# Patient Record
Sex: FEMALE | Race: WHITE | NOT HISPANIC OR LATINO | Employment: OTHER | URBAN - METROPOLITAN AREA
[De-identification: names, ages, dates, MRNs, and addresses within clinical notes are randomized per-mention and may not be internally consistent; named-entity substitution may affect disease eponyms.]

---

## 2023-08-01 LAB
ALANINE AMINOTRANSFERASE (SGPT) (U/L) IN SER/PLAS: 53 U/L (ref 7–45)
ALBUMIN (G/DL) IN SER/PLAS: 4.3 G/DL (ref 3.4–5)
ALKALINE PHOSPHATASE (U/L) IN SER/PLAS: 91 U/L (ref 33–136)
ANION GAP IN SER/PLAS: 12 MMOL/L (ref 10–20)
ASPARTATE AMINOTRANSFERASE (SGOT) (U/L) IN SER/PLAS: 24 U/L (ref 9–39)
BASOPHILS (10*3/UL) IN BLOOD BY AUTOMATED COUNT: 0.04 X10E9/L (ref 0–0.1)
BASOPHILS/100 LEUKOCYTES IN BLOOD BY AUTOMATED COUNT: 1.2 % (ref 0–2)
BILIRUBIN TOTAL (MG/DL) IN SER/PLAS: 0.6 MG/DL (ref 0–1.2)
CALCIUM (MG/DL) IN SER/PLAS: 9.4 MG/DL (ref 8.6–10.3)
CARBON DIOXIDE, TOTAL (MMOL/L) IN SER/PLAS: 28 MMOL/L (ref 21–32)
CHLORIDE (MMOL/L) IN SER/PLAS: 102 MMOL/L (ref 98–107)
CHOLESTEROL (MG/DL) IN SER/PLAS: 203 MG/DL (ref 0–199)
CHOLESTEROL IN HDL (MG/DL) IN SER/PLAS: 55.4 MG/DL
CHOLESTEROL/HDL RATIO: 3.7
CREATINE KINASE (U/L) IN SER/PLAS: 276 U/L (ref 0–215)
CREATININE (MG/DL) IN SER/PLAS: 1.02 MG/DL (ref 0.5–1.05)
EOSINOPHILS (10*3/UL) IN BLOOD BY AUTOMATED COUNT: 0.19 X10E9/L (ref 0–0.7)
EOSINOPHILS/100 LEUKOCYTES IN BLOOD BY AUTOMATED COUNT: 5.7 % (ref 0–6)
ERYTHROCYTE DISTRIBUTION WIDTH (RATIO) BY AUTOMATED COUNT: 11.8 % (ref 11.5–14.5)
ERYTHROCYTE MEAN CORPUSCULAR HEMOGLOBIN CONCENTRATION (G/DL) BY AUTOMATED: 31.8 G/DL (ref 32–36)
ERYTHROCYTE MEAN CORPUSCULAR VOLUME (FL) BY AUTOMATED COUNT: 91 FL (ref 80–100)
ERYTHROCYTES (10*6/UL) IN BLOOD BY AUTOMATED COUNT: 4.37 X10E12/L (ref 4–5.2)
GFR FEMALE: 59 ML/MIN/1.73M2
GLUCOSE (MG/DL) IN SER/PLAS: 106 MG/DL (ref 74–99)
HEMATOCRIT (%) IN BLOOD BY AUTOMATED COUNT: 39.9 % (ref 36–46)
HEMOGLOBIN (G/DL) IN BLOOD: 12.7 G/DL (ref 12–16)
IMMATURE GRANULOCYTES/100 LEUKOCYTES IN BLOOD BY AUTOMATED COUNT: 0.3 % (ref 0–0.9)
LDL: 133 MG/DL (ref 0–99)
LEUKOCYTES (10*3/UL) IN BLOOD BY AUTOMATED COUNT: 3.3 X10E9/L (ref 4.4–11.3)
LYMPHOCYTES (10*3/UL) IN BLOOD BY AUTOMATED COUNT: 1.5 X10E9/L (ref 1.2–4.8)
LYMPHOCYTES/100 LEUKOCYTES IN BLOOD BY AUTOMATED COUNT: 45.2 % (ref 13–44)
MONOCYTES (10*3/UL) IN BLOOD BY AUTOMATED COUNT: 0.38 X10E9/L (ref 0.1–1)
MONOCYTES/100 LEUKOCYTES IN BLOOD BY AUTOMATED COUNT: 11.4 % (ref 2–10)
NEUTROPHILS (10*3/UL) IN BLOOD BY AUTOMATED COUNT: 1.2 X10E9/L (ref 1.2–7.7)
NEUTROPHILS/100 LEUKOCYTES IN BLOOD BY AUTOMATED COUNT: 36.2 % (ref 40–80)
PLATELETS (10*3/UL) IN BLOOD AUTOMATED COUNT: 249 X10E9/L (ref 150–450)
POTASSIUM (MMOL/L) IN SER/PLAS: 4.4 MMOL/L (ref 3.5–5.3)
PROTEIN TOTAL: 7.3 G/DL (ref 6.4–8.2)
SODIUM (MMOL/L) IN SER/PLAS: 138 MMOL/L (ref 136–145)
THYROTROPIN (MIU/L) IN SER/PLAS BY DETECTION LIMIT <= 0.05 MIU/L: 2.09 MIU/L (ref 0.44–3.98)
TRIGLYCERIDE (MG/DL) IN SER/PLAS: 71 MG/DL (ref 0–149)
UREA NITROGEN (MG/DL) IN SER/PLAS: 17 MG/DL (ref 6–23)
VLDL: 14 MG/DL (ref 0–40)

## 2023-09-11 PROBLEM — M47.812 OSTEOARTHRITIS OF CERVICAL SPINE: Status: ACTIVE | Noted: 2023-09-11

## 2023-09-11 PROBLEM — K80.20 GALL STONES: Status: ACTIVE | Noted: 2023-09-11

## 2023-09-11 PROBLEM — M19.90 OSTEOARTHRITIS: Status: ACTIVE | Noted: 2023-09-11

## 2023-09-11 PROBLEM — R74.8 ELEVATED CK: Status: ACTIVE | Noted: 2023-09-11

## 2023-09-11 PROBLEM — M54.81 OCCIPITAL NEURALGIA: Status: ACTIVE | Noted: 2023-09-11

## 2023-09-11 PROBLEM — M54.12 CERVICAL RADICULOPATHY: Status: ACTIVE | Noted: 2023-09-11

## 2023-09-11 PROBLEM — Z78.0 POSTMENOPAUSAL ESTROGEN DEFICIENCY: Status: ACTIVE | Noted: 2023-09-11

## 2023-09-11 PROBLEM — R73.01 IMPAIRED FASTING GLUCOSE: Status: ACTIVE | Noted: 2023-09-11

## 2023-09-11 PROBLEM — M75.81 ROTATOR CUFF TENDONITIS, RIGHT: Status: ACTIVE | Noted: 2023-09-11

## 2023-09-11 PROBLEM — K12.0 APHTHOUS STOMATITIS: Status: ACTIVE | Noted: 2023-09-11

## 2023-09-11 PROBLEM — S39.012A LUMBAR STRAIN: Status: ACTIVE | Noted: 2023-09-11

## 2023-09-11 PROBLEM — B37.2 CANDIDAL INTERTRIGO: Status: ACTIVE | Noted: 2023-09-11

## 2023-09-11 PROBLEM — L30.9 ECZEMA: Status: ACTIVE | Noted: 2023-09-11

## 2023-09-11 PROBLEM — E66.3 OVERWEIGHT WITH BODY MASS INDEX (BMI) OF 26 TO 26.9 IN ADULT: Status: ACTIVE | Noted: 2023-09-11

## 2023-09-11 PROBLEM — E78.5 HYPERLIPIDEMIA: Status: ACTIVE | Noted: 2023-09-11

## 2023-09-11 PROBLEM — R92.8 MAMMOGRAM ABNORMAL: Status: ACTIVE | Noted: 2023-09-11

## 2023-09-11 PROBLEM — Z13.31 POSITIVE SCREENING FOR DEPRESSION ON 2-ITEM PATIENT HEALTH QUESTIONNAIRE (PHQ-2): Status: ACTIVE | Noted: 2023-09-11

## 2023-09-11 PROBLEM — R74.8 ELEVATED LIVER ENZYMES: Status: ACTIVE | Noted: 2023-09-11

## 2023-09-11 PROBLEM — M70.70 ISCHIAL BURSITIS: Status: ACTIVE | Noted: 2023-09-11

## 2023-09-11 PROBLEM — I10 ESSENTIAL HYPERTENSION: Status: ACTIVE | Noted: 2023-09-11

## 2023-09-11 PROBLEM — S82.899A ANKLE FRACTURE: Status: ACTIVE | Noted: 2023-09-11

## 2023-09-11 RX ORDER — TRIAMCINOLONE ACETONIDE 1 MG/G
PASTE DENTAL
COMMUNITY
Start: 2020-10-15

## 2023-09-11 RX ORDER — POTASSIUM CHLORIDE 20 MEQ/1
1 TABLET, EXTENDED RELEASE ORAL DAILY
COMMUNITY
End: 2023-12-26

## 2023-09-11 RX ORDER — NORTRIPTYLINE HYDROCHLORIDE 50 MG/1
1 CAPSULE ORAL NIGHTLY
COMMUNITY
End: 2023-10-02 | Stop reason: SDUPTHER

## 2023-09-11 RX ORDER — EZETIMIBE 10 MG/1
1 TABLET ORAL DAILY
COMMUNITY
End: 2023-11-22 | Stop reason: SDUPTHER

## 2023-09-11 RX ORDER — GABAPENTIN 100 MG/1
3 CAPSULE ORAL NIGHTLY
COMMUNITY
Start: 2023-04-09 | End: 2023-12-26 | Stop reason: SDUPTHER

## 2023-09-11 RX ORDER — LISINOPRIL 20 MG/1
1 TABLET ORAL DAILY
COMMUNITY
End: 2024-01-08

## 2023-09-11 RX ORDER — SPIRONOLACTONE 25 MG/1
1 TABLET ORAL DAILY
COMMUNITY
End: 2024-01-09

## 2023-09-11 RX ORDER — CLOTRIMAZOLE AND BETAMETHASONE DIPROPIONATE 10; .64 MG/G; MG/G
CREAM TOPICAL 2 TIMES DAILY
COMMUNITY
Start: 2014-08-08

## 2023-10-02 DIAGNOSIS — M79.7 FIBROMYALGIA: Primary | ICD-10-CM

## 2023-10-02 RX ORDER — NORTRIPTYLINE HYDROCHLORIDE 50 MG/1
50 CAPSULE ORAL NIGHTLY
Qty: 90 CAPSULE | Refills: 1 | Status: SHIPPED | OUTPATIENT
Start: 2023-10-02 | End: 2024-05-28

## 2023-10-13 ENCOUNTER — OFFICE VISIT (OUTPATIENT)
Dept: RHEUMATOLOGY | Facility: CLINIC | Age: 73
End: 2023-10-13
Payer: MEDICARE

## 2023-10-13 VITALS
HEIGHT: 68 IN | DIASTOLIC BLOOD PRESSURE: 68 MMHG | WEIGHT: 171.2 LBS | BODY MASS INDEX: 25.94 KG/M2 | HEART RATE: 73 BPM | TEMPERATURE: 97.1 F | SYSTOLIC BLOOD PRESSURE: 122 MMHG

## 2023-10-13 DIAGNOSIS — I10 ESSENTIAL HYPERTENSION: Primary | ICD-10-CM

## 2023-10-13 DIAGNOSIS — M54.12 CERVICAL RADICULOPATHY: ICD-10-CM

## 2023-10-13 DIAGNOSIS — M12.812 ROTATOR CUFF ARTHROPATHY OF BOTH SHOULDERS: ICD-10-CM

## 2023-10-13 DIAGNOSIS — R74.8 ELEVATED CK: ICD-10-CM

## 2023-10-13 DIAGNOSIS — R73.01 IMPAIRED FASTING GLUCOSE: ICD-10-CM

## 2023-10-13 DIAGNOSIS — E66.3 OVERWEIGHT WITH BODY MASS INDEX (BMI) OF 26 TO 26.9 IN ADULT: ICD-10-CM

## 2023-10-13 DIAGNOSIS — E78.5 HYPERLIPIDEMIA, UNSPECIFIED HYPERLIPIDEMIA TYPE: ICD-10-CM

## 2023-10-13 DIAGNOSIS — R74.01 ELEVATED LIVER TRANSAMINASE LEVEL: ICD-10-CM

## 2023-10-13 DIAGNOSIS — M12.811 ROTATOR CUFF ARTHROPATHY OF BOTH SHOULDERS: ICD-10-CM

## 2023-10-13 PROCEDURE — G0008 ADMIN INFLUENZA VIRUS VAC: HCPCS | Performed by: INTERNAL MEDICINE

## 2023-10-13 PROCEDURE — 90662 IIV NO PRSV INCREASED AG IM: CPT | Performed by: INTERNAL MEDICINE

## 2023-10-13 PROCEDURE — 99214 OFFICE O/P EST MOD 30 MIN: CPT | Performed by: INTERNAL MEDICINE

## 2023-10-13 PROCEDURE — 1160F RVW MEDS BY RX/DR IN RCRD: CPT | Performed by: INTERNAL MEDICINE

## 2023-10-13 PROCEDURE — 1036F TOBACCO NON-USER: CPT | Performed by: INTERNAL MEDICINE

## 2023-10-13 PROCEDURE — 3078F DIAST BP <80 MM HG: CPT | Performed by: INTERNAL MEDICINE

## 2023-10-13 PROCEDURE — 3008F BODY MASS INDEX DOCD: CPT | Performed by: INTERNAL MEDICINE

## 2023-10-13 PROCEDURE — 3074F SYST BP LT 130 MM HG: CPT | Performed by: INTERNAL MEDICINE

## 2023-10-13 PROCEDURE — 1159F MED LIST DOCD IN RCRD: CPT | Performed by: INTERNAL MEDICINE

## 2023-10-13 RX ORDER — METHYLPREDNISOLONE 4 MG/1
TABLET ORAL
Qty: 21 TABLET | Refills: 0 | Status: SHIPPED | OUTPATIENT
Start: 2023-10-13 | End: 2023-10-20

## 2023-10-13 ASSESSMENT — PATIENT HEALTH QUESTIONNAIRE - PHQ9
SUM OF ALL RESPONSES TO PHQ9 QUESTIONS 1 AND 2: 0
1. LITTLE INTEREST OR PLEASURE IN DOING THINGS: NOT AT ALL
2. FEELING DOWN, DEPRESSED OR HOPELESS: NOT AT ALL

## 2023-10-13 ASSESSMENT — ENCOUNTER SYMPTOMS
LOSS OF SENSATION IN FEET: 0
OCCASIONAL FEELINGS OF UNSTEADINESS: 0
DEPRESSION: 0

## 2023-10-13 NOTE — PROGRESS NOTES
Subjective   Patient ID: Fany Ramirez is a 72 y.o. female who presents for Follow-up.    HPI 72-year-old female with history of essential hypertension, hyperlipidemia, osteoarthritis, osteopenia, and hyper CKemia who is here for f/u.    Left shoulder pain was better after injection of left subacromial bursa 2/23.     She now c/o recurrent pain in both shoulders, right greater than left.  Pain has been present for 1 to 2 months.  The pain radiates down her right arm to the hand.  She denies any numbness and tingling in her hand.  She has slight neck pain.  Cervical spine x-ray done February 2023 shows  Previous fusion at C4-6, degenerative disc disease at C3-4, C4-5, C6-7.    She had transient elevation of her liver enzymes August 2022 (primarily liver transaminases), without clear etiology.  She had no abdominal pain and was otherwise feeling fine.  Liver enzymes repeated October 10, 2022 are normal.  She is known to have gallstones on right upper quadrant ultrasound done in 2016.    She had corticosteroid injection of her right subacromial bursa 2/22 and 6/22, which helped her shoulder pain.    She stopped using medical marijuana after having a sinus infection in April 2022.    Because of her impaired fasting glucose, she has been following a low carbohydrate diet.    She does live with her son-in-law, daughter and 2 grandchildren.    She received Pfizer COVID-19 vaccine March 27, 2021 and April 17, 2021. She had boosters November 28, 2021 and 7/02/22.    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  EKG 2/13/20: Sinus arrhythmia, otherwise normal.  EKG October 8, 2021: Normal    DEXA 4/18: T score -1.4 femoral neck, normal lumbar spine.  DEXA 1/22: T score -1.1 left femoral neck (increased 5.1%), T score normal total hip, T score normal  "lumbar spine.    Labs 2/22: BMP normal except glucose 109, hemoglobin A1c 6.1  Labs 8/22: CMP normal except alkaline phosphatase 153,  (7-45),  (9-39), , cholesterol 207, HDL 69, , triglycerides 59  Labs October 10, 2022: CMP normal except random glucose 107 (GFR 59)  Labs 8/23: Seen CMP normal except glucose 106 and GFR 59 and ALT 53, CK2 76, CBC normal except white blood cell count 3.3, cholesterol 203, HDL 55, , triglycerides 171    Cervical spine x-ray done February 2023: Evidence of anterior fusion C4-C6. There is disc height loss with spurring at C3-4, C6-7, and .-T1.  Lumbar spine x-ray done 2/23: Further disc space height loss at L4-S1 with spinal and neural foraminal stenosis at L4-S1, progressed from previous exam.    Health maintenance:   Mammogram 8/23 CT plaints of bilateral shoulder pain, right greater than left   Colonoscopy 11/14- needs 10 year follow-up   Tdap 11/12   HD Flu shot 10/08/21   Zostavax 11/15   Prevnar 3/18   Pneumovax 6/19   Shingrix #1 December 2018, #2 3/19   Pfizer COVID-19 vaccine 3/27/21. 4/14/21, booster 11/28/21, booster 7/02/22    ROS:  General: Denies fevers or chills.  CV: Denies chest pain or palpitations.  Denies leg edema.  Lungs: Denies coughing or shortness of breath.  Skin: Denies rashes or nodules.  MS: c/o bilateral shoulder pain, right greater than left. Has pain radiating down right arm to hand. Also has some neck pain.    Objective   /68 (BP Location: Right arm, Patient Position: Sitting, BP Cuff Size: Adult)   Pulse 73   Temp 36.2 °C (97.1 °F) (Temporal)   Ht 1.721 m (5' 7.75\")   Wt 77.7 kg (171 lb 3.2 oz)   BMI 26.22 kg/m²     Physical Exam  HEENT: PERRL, EOMI  Neck: Supple, no nodes.  CV: RRR, no MGR.  Lungs: Clear, no rales or wheezes.  Abdomen: Soft, nontender. No hepatosplenomegaly.  Extremities:  No cyanosis, clubbing, or edema.   MS: Shoulder motion without warmth or effusion.  Has pain in right shoulder with " abduction greater than 80 degrees, active greater than passive.  Has pain with forward elevation greater than 80 degrees.  Has pain with extension and internal rotation-can internally rotate to L5.  Has pain in the left shoulder with abduction greater than 90 degrees, active greater than passive.  Has pain with forward elevation greater than 90 degrees.  She can internally rotate with the left arm to L3.    Assessment/Plan   Problem List Items Addressed This Visit             ICD-10-CM    Cervical radiculopathy M54.12    Relevant Medications    methylPREDNISolone (Medrol Dospak) 4 mg tablets    Elevated CK R74.8    Essential hypertension - Primary I10    Hyperlipidemia E78.5    Impaired fasting glucose R73.01    Overweight with body mass index (BMI) of 26 to 26.9 in adult E66.3, Z68.26    Rotator cuff arthropathy M12.819     Other Visit Diagnoses         Codes    Elevated liver transaminase level     R74.01    Relevant Orders    Hepatic function panel          Impression:  Bilateral rotator cuff tendinitis-right greater than left.    Cervical radiculopathy-likely also contributing to pain rating down the right arm of the hand.    Follow-up in 4 months  Chronically elevated CK-currently mildly elevated at 276.    Mildly elevated ALT.    Impaired fasting glucose-she will continue to minimize carbohydrate intake watch weight and continue exercise,.    BMI 26- stable.    Plan:  Try Medrol Dosepak.  If symptoms do not improve, will consider Kenalog injection of right shoulder.  High-dose flu vaccine given.  Repeat hepatic function panel .  Follow-up in 4 months.

## 2023-10-13 NOTE — PATIENT INSTRUCTIONS
High dose flu vaccine was given today.  You can do COVID vaccine in 2 weeks.  Try medrol dose pack to help with cervical radiculopathy and rotator cuff tendonitis.  If symptoms do not improve, recommend follow up for Kenalog injection in right shoulder.  Check labs 12/23: hepatic function panel.  Follow-up in 4 months.

## 2023-10-14 PROBLEM — M12.819 ROTATOR CUFF ARTHROPATHY: Status: ACTIVE | Noted: 2023-10-14

## 2023-11-22 DIAGNOSIS — E78.5 HYPERLIPIDEMIA, UNSPECIFIED HYPERLIPIDEMIA TYPE: Primary | ICD-10-CM

## 2023-11-22 RX ORDER — EZETIMIBE 10 MG/1
10 TABLET ORAL DAILY
Qty: 90 TABLET | Refills: 3 | Status: SHIPPED | OUTPATIENT
Start: 2023-11-22

## 2023-12-01 ENCOUNTER — LAB (OUTPATIENT)
Dept: LAB | Facility: LAB | Age: 73
End: 2023-12-01
Payer: MEDICARE

## 2023-12-01 DIAGNOSIS — R74.01 ELEVATED LIVER TRANSAMINASE LEVEL: ICD-10-CM

## 2023-12-01 LAB
ALBUMIN SERPL BCP-MCNC: 4.2 G/DL (ref 3.4–5)
ALP SERPL-CCNC: 85 U/L (ref 33–136)
ALT SERPL W P-5'-P-CCNC: 14 U/L (ref 7–45)
AST SERPL W P-5'-P-CCNC: 20 U/L (ref 9–39)
BILIRUB DIRECT SERPL-MCNC: 0.1 MG/DL (ref 0–0.3)
BILIRUB SERPL-MCNC: 0.5 MG/DL (ref 0–1.2)
PROT SERPL-MCNC: 7.4 G/DL (ref 6.4–8.2)

## 2023-12-01 PROCEDURE — 36415 COLL VENOUS BLD VENIPUNCTURE: CPT

## 2023-12-01 PROCEDURE — 80076 HEPATIC FUNCTION PANEL: CPT

## 2023-12-23 DIAGNOSIS — I10 ESSENTIAL HYPERTENSION: Primary | ICD-10-CM

## 2023-12-26 DIAGNOSIS — M47.812 OSTEOARTHRITIS OF CERVICAL SPINE, UNSPECIFIED SPINAL OSTEOARTHRITIS COMPLICATION STATUS: Primary | ICD-10-CM

## 2023-12-26 RX ORDER — GABAPENTIN 100 MG/1
300 CAPSULE ORAL NIGHTLY
Qty: 90 CAPSULE | Refills: 11 | Status: SHIPPED | OUTPATIENT
Start: 2023-12-26 | End: 2024-01-08 | Stop reason: WASHOUT

## 2023-12-26 RX ORDER — POTASSIUM CHLORIDE 20 MEQ/1
20 TABLET, EXTENDED RELEASE ORAL DAILY
Qty: 90 TABLET | Refills: 3 | Status: SHIPPED | OUTPATIENT
Start: 2023-12-26

## 2024-01-08 DIAGNOSIS — M47.812 OSTEOARTHRITIS OF CERVICAL SPINE, UNSPECIFIED SPINAL OSTEOARTHRITIS COMPLICATION STATUS: ICD-10-CM

## 2024-01-08 DIAGNOSIS — M54.12 CERVICAL RADICULOPATHY: Primary | ICD-10-CM

## 2024-01-08 DIAGNOSIS — I10 PRIMARY HYPERTENSION: Primary | ICD-10-CM

## 2024-01-08 RX ORDER — GABAPENTIN 100 MG/1
300 CAPSULE ORAL NIGHTLY
Qty: 90 CAPSULE | Refills: 11 | Status: CANCELLED | OUTPATIENT
Start: 2024-01-08

## 2024-01-08 RX ORDER — LISINOPRIL 20 MG/1
20 TABLET ORAL DAILY
Qty: 90 TABLET | Refills: 1 | Status: SHIPPED | OUTPATIENT
Start: 2024-01-08 | End: 2024-05-31

## 2024-01-08 RX ORDER — GABAPENTIN 300 MG/1
300 CAPSULE ORAL NIGHTLY
Qty: 90 CAPSULE | Refills: 1 | Status: SHIPPED | OUTPATIENT
Start: 2024-01-08 | End: 2024-05-28

## 2024-01-09 DIAGNOSIS — I10 PRIMARY HYPERTENSION: Primary | ICD-10-CM

## 2024-01-09 RX ORDER — SPIRONOLACTONE 25 MG/1
25 TABLET ORAL DAILY
Qty: 90 TABLET | Refills: 1 | Status: SHIPPED | OUTPATIENT
Start: 2024-01-09 | End: 2024-05-17

## 2024-02-15 ENCOUNTER — LAB (OUTPATIENT)
Dept: LAB | Facility: LAB | Age: 74
End: 2024-02-15
Payer: MEDICARE

## 2024-02-15 ENCOUNTER — OFFICE VISIT (OUTPATIENT)
Dept: RHEUMATOLOGY | Facility: CLINIC | Age: 74
End: 2024-02-15
Payer: MEDICARE

## 2024-02-15 VITALS
OXYGEN SATURATION: 98 % | HEIGHT: 68 IN | WEIGHT: 169.4 LBS | SYSTOLIC BLOOD PRESSURE: 130 MMHG | HEART RATE: 97 BPM | DIASTOLIC BLOOD PRESSURE: 88 MMHG | TEMPERATURE: 97.6 F | BODY MASS INDEX: 25.67 KG/M2

## 2024-02-15 DIAGNOSIS — M75.81 ROTATOR CUFF TENDONITIS, RIGHT: ICD-10-CM

## 2024-02-15 DIAGNOSIS — I10 ESSENTIAL HYPERTENSION: Primary | ICD-10-CM

## 2024-02-15 DIAGNOSIS — I10 ESSENTIAL HYPERTENSION: ICD-10-CM

## 2024-02-15 DIAGNOSIS — R73.01 IMPAIRED FASTING GLUCOSE: ICD-10-CM

## 2024-02-15 DIAGNOSIS — E78.5 HYPERLIPIDEMIA, UNSPECIFIED HYPERLIPIDEMIA TYPE: ICD-10-CM

## 2024-02-15 LAB
ANION GAP SERPL CALC-SCNC: 10 MMOL/L (ref 10–20)
BUN SERPL-MCNC: 16 MG/DL (ref 6–23)
CALCIUM SERPL-MCNC: 9.7 MG/DL (ref 8.6–10.3)
CHLORIDE SERPL-SCNC: 101 MMOL/L (ref 98–107)
CO2 SERPL-SCNC: 30 MMOL/L (ref 21–32)
CREAT SERPL-MCNC: 1.03 MG/DL (ref 0.5–1.05)
EGFRCR SERPLBLD CKD-EPI 2021: 58 ML/MIN/1.73M*2
GLUCOSE SERPL-MCNC: 113 MG/DL (ref 74–99)
POTASSIUM SERPL-SCNC: 4.3 MMOL/L (ref 3.5–5.3)
SODIUM SERPL-SCNC: 137 MMOL/L (ref 136–145)

## 2024-02-15 PROCEDURE — 3075F SYST BP GE 130 - 139MM HG: CPT | Performed by: INTERNAL MEDICINE

## 2024-02-15 PROCEDURE — 1170F FXNL STATUS ASSESSED: CPT | Performed by: INTERNAL MEDICINE

## 2024-02-15 PROCEDURE — 36415 COLL VENOUS BLD VENIPUNCTURE: CPT

## 2024-02-15 PROCEDURE — 1123F ACP DISCUSS/DSCN MKR DOCD: CPT | Performed by: INTERNAL MEDICINE

## 2024-02-15 PROCEDURE — 99214 OFFICE O/P EST MOD 30 MIN: CPT | Performed by: INTERNAL MEDICINE

## 2024-02-15 PROCEDURE — 1157F ADVNC CARE PLAN IN RCRD: CPT | Performed by: INTERNAL MEDICINE

## 2024-02-15 PROCEDURE — 3008F BODY MASS INDEX DOCD: CPT | Performed by: INTERNAL MEDICINE

## 2024-02-15 PROCEDURE — 3079F DIAST BP 80-89 MM HG: CPT | Performed by: INTERNAL MEDICINE

## 2024-02-15 PROCEDURE — G0439 PPPS, SUBSEQ VISIT: HCPCS | Performed by: INTERNAL MEDICINE

## 2024-02-15 PROCEDURE — 1159F MED LIST DOCD IN RCRD: CPT | Performed by: INTERNAL MEDICINE

## 2024-02-15 PROCEDURE — 1036F TOBACCO NON-USER: CPT | Performed by: INTERNAL MEDICINE

## 2024-02-15 PROCEDURE — 99497 ADVNCD CARE PLAN 30 MIN: CPT | Performed by: INTERNAL MEDICINE

## 2024-02-15 PROCEDURE — 1158F ADVNC CARE PLAN TLK DOCD: CPT | Performed by: INTERNAL MEDICINE

## 2024-02-15 PROCEDURE — 80048 BASIC METABOLIC PNL TOTAL CA: CPT

## 2024-02-15 PROCEDURE — 1160F RVW MEDS BY RX/DR IN RCRD: CPT | Performed by: INTERNAL MEDICINE

## 2024-02-15 ASSESSMENT — PATIENT HEALTH QUESTIONNAIRE - PHQ9
SUM OF ALL RESPONSES TO PHQ9 QUESTIONS 1 AND 2: 0
2. FEELING DOWN, DEPRESSED OR HOPELESS: NOT AT ALL
1. LITTLE INTEREST OR PLEASURE IN DOING THINGS: NOT AT ALL

## 2024-02-15 ASSESSMENT — ACTIVITIES OF DAILY LIVING (ADL)
BATHING: INDEPENDENT
DRESSING: INDEPENDENT

## 2024-02-15 NOTE — PROGRESS NOTES
Subjective   Patient ID: Fany Ramirez is a 73 y.o. female who presents for Follow-up and medicare Wellness Visit.    HPI  73-year-old female with history of essential hypertension, hyperlipidemia, osteoarthritis, osteopenia, and hyper CKemia who is here for f/u.     Left shoulder pain was better after injection of left subacromial bursa 2/23.   She had corticosteroid injection of her right subacromial bursa 2/22 and 6/22, which helped her shoulder pain.     Oral steroid taper October 2023 helped with shoulder pain.  She no longer has left shoulder pain.  She gets right shoulder pain off and on, depending how she lays on the shoulder.    She has slight neck pain.  Off-and-on cervical spine x-ray done February 2023 shows  Previous fusion at C4-6, degenerative disc disease at C3-4, C4-5, C6-7.    She gets episodic pain of left first CMC joint     She had transient elevation of her liver enzymes August 2022 (primarily liver transaminases), without clear etiology.  She had no abdominal pain and was otherwise feeling fine.  Liver enzymes repeated October 10, 2022 are normal.  She is known to have gallstones on right upper quadrant ultrasound done in 2016.  She had mildly elevated ALT August 2023.  Hepatic function panel repeated December 2020 was normal.     Because of her impaired fasting glucose, she has been following a low carbohydrate diet.    She exercises on a regular basis.  She rides a recumbent bike daily for 2 hours total.  She tries to take 10,000 steps daily.     She does live with her son-in-law, daughter and 2 grandchildren.    She has a living well.  She is currently a full code.  Healthcare power of  is her daughter Patricia Mercado ([phone]), second agent is Juancarlos Knutson ([phone]).     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  EKG 2/13/20: Sinus arrhythmia, otherwise normal.  EKG October 8, 2021: Normal     DEXA 4/18: T score -1.4 femoral neck, normal lumbar spine.  DEXA 1/22: T score -1.1 left femoral neck (increased 5.1%), T score normal total hip, T score normal lumbar spine.     Labs 2/22: BMP normal except glucose 109, hemoglobin A1c 6.1  Labs 8/22: CMP normal except alkaline phosphatase 153,  (7-45),  (9-39), , cholesterol 207, HDL 69, , triglycerides 59  Labs October 10, 2022: CMP normal except random glucose 107 (GFR 59)  Labs 8/23: Seen CMP normal except glucose 106 and GFR 59 and ALT 53, CK2 76, CBC normal except white blood cell count 3.3, cholesterol 203, HDL 55, , triglycerides 171  Labs 12/23: Hepatic function panel normal,      Cervical spine x-ray done February 2023: Evidence of anterior fusion C4-C6. There is disc height loss with spurring at C3-4, C6-7, and .-T1.  Lumbar spine x-ray done 2/23: Further disc space height loss at L4-S1 with spinal and neural foraminal stenosis at L4-S1, progressed from previous exam.     Health maintenance:   Mammogram 8/23    Colonoscopy 11/14- needs 10 year follow-up   Tdap 11/12   HD Flu shot 10/08/21   Zostavax 11/15   Prevnar-13 3/18   Pneumovax 6/19   Shingrix #1 December 2018, #2 3/19   Pfizer COVID-19 vaccine 3/27/21. 4/14/21, booster 11/28/21, booster 7/02/22  Pfizer COVID-vaccine October 27, 2023  Eye exam 8/23- [facility]       ROS:  General: Denies fevers or chills.  CV: Denies chest pain or palpitations.  Denies leg edema.  Lungs: Denies coughing or shortness of breath.  Skin: Denies rashes or nodules.  MS: c/o intermittent right shoulder pain.      Objective   /88 (BP Location: Left arm, Patient Position: Sitting, BP Cuff Size: Small adult)   Pulse 97   Temp 36.4 °C (97.6 °F)   Ht 1.721 m (5' 7.75\")   Wt 76.8 kg (169 lb 6.4 oz)   SpO2 98%   BMI 25.95 kg/m²     Physical Exam  HEENT: ELEANOR, " EOMI  Neck: Supple, no nodes.  CV: RRR, no MGR.  Lungs: Clear, no rales or wheezes.  Abdomen: Soft, nontender. No hepatosplenomegaly.  Extremities:  No cyanosis, clubbing, or edema.   MS: Shoulder motion without warmth or effusion.  Has pain in right shoulder with abduction greater than 80 degrees, active greater than passive.  Has pain with forward elevation greater than 80 degrees.  Has pain with extension and internal rotation-can internally rotate to L5.       Assessment/Plan   Problem List Items Addressed This Visit             ICD-10-CM    Essential hypertension - Primary I10    Hyperlipidemia E78.5    Impaired fasting glucose R73.01    BMI 25.0-25.9,adult Z68.25     Problem List Items Addressed This Visit             ICD-10-CM    Essential hypertension - Primary I10    Relevant Orders    Basic Metabolic Panel    Hyperlipidemia E78.5    Impaired fasting glucose R73.01    Rotator cuff tendonitis, right M75.81    BMI 25.0-25.9,adult Z68.25         Essential hypertension-currently well-controlled.    Elevated CK-felt due to benign hyper CK anemia.  Aug 2023.    Hyperlipidemia-reasonable control 8/23 with Zetia 10 mg daily.    Impaired fasting glucose-currently working on low carbohydrate diet.    BMI 25.9-stable.    Right rotator cuff arthropathy-pain comes and goes.  I advised her to continue topical treatment such as Voltaren gel 4 g 4 times daily or Biofreeze.  She was given exercises for shoulder pain.  If symptoms worsen, could try physical therapy or another Kenalog injection.    Medicare wellness visit done 2/22/24.    ACP discussed- she has living will. HPOA is daughter Patricia Mercado ([phone]), 2nd agent Juancarlos Knutson ([phone]). She is full code.    Plan:  Check BMP.  Continue Voltaren gel 4 g 4 times daily or Biofreeze to right shoulder.  Work on shoulder exercises.  Medicare wellness visit was done today.  Repeat EKG next visit.  Follow-up in 4 months.

## 2024-02-15 NOTE — PATIENT INSTRUCTIONS
Check BMP.  Continue Voltaren gel 4 g 4 times daily or Biofreeze to right shoulder.  Work on shoulder exercises.  Medicare wellness visit was done today.  Repeat EKG next visit.  Follow-up in 4 months.

## 2024-05-16 DIAGNOSIS — I10 PRIMARY HYPERTENSION: ICD-10-CM

## 2024-05-17 RX ORDER — SPIRONOLACTONE 25 MG/1
25 TABLET ORAL DAILY
Qty: 30 TABLET | Refills: 0 | Status: SHIPPED | OUTPATIENT
Start: 2024-05-17 | End: 2024-06-10

## 2024-05-27 DIAGNOSIS — M54.12 CERVICAL RADICULOPATHY: ICD-10-CM

## 2024-05-27 DIAGNOSIS — M79.7 FIBROMYALGIA: ICD-10-CM

## 2024-05-28 RX ORDER — GABAPENTIN 300 MG/1
300 CAPSULE ORAL
Qty: 90 CAPSULE | Refills: 1 | Status: SHIPPED | OUTPATIENT
Start: 2024-05-28

## 2024-05-28 RX ORDER — NORTRIPTYLINE HYDROCHLORIDE 50 MG/1
50 CAPSULE ORAL
Qty: 90 CAPSULE | Refills: 1 | Status: SHIPPED | OUTPATIENT
Start: 2024-05-28

## 2024-05-31 DIAGNOSIS — I10 PRIMARY HYPERTENSION: ICD-10-CM

## 2024-05-31 RX ORDER — LISINOPRIL 20 MG/1
20 TABLET ORAL DAILY
Qty: 90 TABLET | Refills: 1 | Status: SHIPPED | OUTPATIENT
Start: 2024-05-31

## 2024-06-07 DIAGNOSIS — I10 PRIMARY HYPERTENSION: ICD-10-CM

## 2024-06-10 RX ORDER — SPIRONOLACTONE 25 MG/1
25 TABLET ORAL DAILY
Qty: 90 TABLET | Refills: 1 | Status: SHIPPED | OUTPATIENT
Start: 2024-06-10

## 2024-06-17 ENCOUNTER — APPOINTMENT (OUTPATIENT)
Dept: RHEUMATOLOGY | Facility: CLINIC | Age: 74
End: 2024-06-17
Payer: MEDICARE

## 2024-06-17 VITALS
DIASTOLIC BLOOD PRESSURE: 70 MMHG | BODY MASS INDEX: 25.28 KG/M2 | TEMPERATURE: 97.1 F | HEART RATE: 97 BPM | SYSTOLIC BLOOD PRESSURE: 122 MMHG | OXYGEN SATURATION: 96 % | HEIGHT: 68 IN | WEIGHT: 166.8 LBS

## 2024-06-17 DIAGNOSIS — E78.5 HYPERLIPIDEMIA, UNSPECIFIED HYPERLIPIDEMIA TYPE: ICD-10-CM

## 2024-06-17 DIAGNOSIS — R74.8 ELEVATED CK: ICD-10-CM

## 2024-06-17 DIAGNOSIS — Z12.31 VISIT FOR SCREENING MAMMOGRAM: ICD-10-CM

## 2024-06-17 DIAGNOSIS — M54.12 CERVICAL RADICULOPATHY: ICD-10-CM

## 2024-06-17 DIAGNOSIS — R73.01 IMPAIRED FASTING GLUCOSE: ICD-10-CM

## 2024-06-17 DIAGNOSIS — I10 ESSENTIAL HYPERTENSION: Primary | ICD-10-CM

## 2024-06-17 PROBLEM — Z86.79 HISTORY OF HYPERTENSION: Status: ACTIVE | Noted: 2024-06-17

## 2024-06-17 PROBLEM — J01.90 ACUTE SINUSITIS: Status: RESOLVED | Noted: 2024-06-17 | Resolved: 2024-06-17

## 2024-06-17 PROBLEM — Z86.79 HISTORY OF HYPERTENSION: Status: RESOLVED | Noted: 2024-06-17 | Resolved: 2024-06-17

## 2024-06-17 PROBLEM — J01.90 ACUTE SINUSITIS: Status: ACTIVE | Noted: 2024-06-17

## 2024-06-17 PROBLEM — R10.9 ABDOMINAL PAIN: Status: ACTIVE | Noted: 2024-06-17

## 2024-06-17 PROBLEM — M75.80 ROTATOR CUFF TENDINITIS: Status: ACTIVE | Noted: 2024-06-17

## 2024-06-17 PROCEDURE — 3008F BODY MASS INDEX DOCD: CPT | Performed by: INTERNAL MEDICINE

## 2024-06-17 PROCEDURE — 3074F SYST BP LT 130 MM HG: CPT | Performed by: INTERNAL MEDICINE

## 2024-06-17 PROCEDURE — 1160F RVW MEDS BY RX/DR IN RCRD: CPT | Performed by: INTERNAL MEDICINE

## 2024-06-17 PROCEDURE — 1158F ADVNC CARE PLAN TLK DOCD: CPT | Performed by: INTERNAL MEDICINE

## 2024-06-17 PROCEDURE — 1157F ADVNC CARE PLAN IN RCRD: CPT | Performed by: INTERNAL MEDICINE

## 2024-06-17 PROCEDURE — 1123F ACP DISCUSS/DSCN MKR DOCD: CPT | Performed by: INTERNAL MEDICINE

## 2024-06-17 PROCEDURE — 1159F MED LIST DOCD IN RCRD: CPT | Performed by: INTERNAL MEDICINE

## 2024-06-17 PROCEDURE — 99214 OFFICE O/P EST MOD 30 MIN: CPT | Performed by: INTERNAL MEDICINE

## 2024-06-17 PROCEDURE — 1036F TOBACCO NON-USER: CPT | Performed by: INTERNAL MEDICINE

## 2024-06-17 PROCEDURE — 3078F DIAST BP <80 MM HG: CPT | Performed by: INTERNAL MEDICINE

## 2024-06-17 RX ORDER — GABAPENTIN 300 MG/1
300 CAPSULE ORAL NIGHTLY
Qty: 90 CAPSULE | Refills: 1 | Status: SHIPPED | OUTPATIENT
Start: 2024-06-17

## 2024-06-17 NOTE — PROGRESS NOTES
Subjective   Patient ID: Fany Ramirez is a 73 y.o. female who presents for Follow-up.    HPI  73-year-old female with history of essential hypertension, hyperlipidemia, osteoarthritis, osteopenia, and hyper CKemia who is here for f/u.     Left shoulder pain was better after injection of left subacromial bursa 2/23.   She had corticosteroid injection of her right subacromial bursa 2/22 and 6/22, which helped her shoulder pain.  She stopped that right shoulder pain off and on, for which she uses Biofreeze.  She does not think it is severe enough to inject again at present.     Oral steroid taper October 2023 helped with shoulder pain.  She no longer has left shoulder pain.    She has slight neck pain.  Off-and-on cervical spine x-ray done February 2023 shows  Previous fusion at C4-6, degenerative disc disease at C3-4, C4-5, C6-7.    She gets episodic pain of left first CMC joint.  She wears a compression glove at nighttime to sleep which helps.  She notes that her thumb was injured at her job about 15 years ago, when the thumb got hyperextended.  At that time she needed to wear her wrist splint with thumb spica for 6 weeks.     She had transient elevation of her liver enzymes August 2022 (primarily liver transaminases), without clear etiology.  She had no abdominal pain and was otherwise feeling fine.  Liver enzymes repeated October 10, 2022 are normal.  She is known to have gallstones on right upper quadrant ultrasound done in 2016.  She had mildly elevated ALT August 2023.  Hepatic function panel repeated December 2020 was normal.     Because of her impaired fasting glucose, she has been following a low carbohydrate diet.    She exercises on a regular basis.  She tries to take 10,000 steps daily.  She is currently walking on a treadmill for 90 minutes 5 days/week.     She does live with her son-in-law, daughter and 2 grandchildren.    She has a living well.  She is currently a full code.  Healthcare power of   is her daughter Patricia Mercado ([phone]), second agent is Juancarlos Knutson ([phone]).     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  EKG 2/13/20: Sinus arrhythmia, otherwise normal.  EKG October 8, 2021: Normal     DEXA 4/18: T score -1.4 femoral neck, normal lumbar spine.  DEXA 1/22: T score -1.1 left femoral neck (increased 5.1%), T score normal total hip, T score normal lumbar spine.     Labs 2/22: BMP normal except glucose 109, hemoglobin A1c 6.1  Labs 8/22: CMP normal except alkaline phosphatase 153,  (7-45),  (9-39), , cholesterol 207, HDL 69, , triglycerides 59  Labs October 10, 2022: CMP normal except random glucose 107 (GFR 59)  Labs 8/23: Seen CMP normal except glucose 106 and GFR 59 and ALT 53, CK2 76, CBC normal except white blood cell count 3.3, cholesterol 203, HDL 55, , triglycerides 171  Labs 12/23: Hepatic function panel normal,      Cervical spine x-ray done February 2023: Evidence of anterior fusion C4-C6. There is disc height loss with spurring at C3-4, C6-7, and .-T1.  Lumbar spine x-ray done 2/23: Further disc space height loss at L4-S1 with spinal and neural foraminal stenosis at L4-S1, progressed from previous exam.     Health maintenance:   Mammogram 8/23    Colonoscopy 11/14- needs 10 year follow-up   Tdap 11/12   HD Flu shot 10/08/21   Zostavax 11/15   Prevnar-13 3/18   Pneumovax 6/19   Shingrix #1 December 2018, #2 3/19   Pfizer COVID-19 vaccine 3/27/21. 4/14/21, booster 11/28/21, booster 7/02/22  Pfizer COVID-vaccine October 27, 2023  Eye exam 8/23- [facility]       ROS:  General: Denies fevers or chills.  CV: Denies chest pain or palpitations.  Denies leg edema.  Lungs: Denies coughing or shortness of breath.  Skin: Denies rashes or nodules.  MS: c/o  "intermittent right shoulder pain.      Objective   /70 (BP Location: Left arm, Patient Position: Sitting, BP Cuff Size: Large adult)   Pulse 97   Temp 36.2 °C (97.1 °F)   Ht 1.721 m (5' 7.75\")   Wt 75.7 kg (166 lb 12.8 oz)   SpO2 96%   BMI 25.55 kg/m²     Physical Exam  HEENT: PERRL, EOMI  Neck: Supple, no nodes.  CV: RRR, no MGR.  Lungs: Clear, no rales or wheezes.  Abdomen: Soft, nontender. No hepatosplenomegaly.  Extremities:  No cyanosis, clubbing, or edema.   MS: No synovitis.  Left first CMC joint nontender.    Assessment/Plan   Problem List Items Addressed This Visit             ICD-10-CM    Cervical radiculopathy M54.12    Relevant Medications    gabapentin (Neurontin) 300 mg capsule    Essential hypertension - Primary I10    Relevant Orders    Comprehensive Metabolic Panel    CBC and Auto Differential    Hyperlipidemia E78.5    Relevant Orders    Comprehensive Metabolic Panel    Lipid Panel    CBC and Auto Differential    Tsh With Reflex To Free T4 If Abnormal    Impaired fasting glucose R73.01    Relevant Orders    Hemoglobin A1c     Other Visit Diagnoses         Codes    Visit for screening mammogram     Z12.31    Relevant Orders    BI mammo bilateral screening tomosynthesis          Essential hypertension-currently well-controlled.    Elevated CK-felt due to benign hyper CK anemia.  Aug 2023.    Hyperlipidemia-reasonable control 8/23 with Zetia 10 mg daily.    Impaired fasting glucose-currently working on low carbohydrate diet.    BMI 25.-stable.    Right rotator cuff arthropathy-pain comes and goes.  She will continue using Biofreeze as needed.    Medicare wellness visit done 2/22/24.    ACP discussed 2/24- she has living will. HPOA is daughter Patricia Mercado ([phone]), 2nd agent Juancarlos Lopezlindsey ([phone]). She is full code.    OA left first CMC joint-recommend Voltaren gel pea-sized amount 4 times daily as needed.  She can continue to wear compression glove as " needed.    Plan:  Check labs 8/24 after 12 hour fast: CMP, CBC with diff, TSH, lipids,CK.  Mammogram ordered to do after 8/05/24.  Colonoscopy will be due 11/24.  Recommend Voltaren gel 4 times daily as needed to base of left thumb.  You can continue to wear compression glove as needed.  Follow-up in 4 months.

## 2024-06-17 NOTE — PATIENT INSTRUCTIONS
Check labs 8/24 after 12 hour fast: CMP, CBC with diff, TSH, lipids,CK.  Mammogram ordered to do after 8/05/24.  Colonoscopy will be due 11/24.  Recommend Voltaren gel 4 times daily as needed to base of left thumb.  You can continue to wear compression glove as needed.  Follow-up in 4 months.

## 2024-08-01 ENCOUNTER — LAB (OUTPATIENT)
Dept: LAB | Facility: LAB | Age: 74
End: 2024-08-01
Payer: MEDICARE

## 2024-08-01 DIAGNOSIS — I10 ESSENTIAL HYPERTENSION: ICD-10-CM

## 2024-08-01 DIAGNOSIS — E78.5 HYPERLIPIDEMIA, UNSPECIFIED HYPERLIPIDEMIA TYPE: ICD-10-CM

## 2024-08-01 DIAGNOSIS — R73.01 IMPAIRED FASTING GLUCOSE: ICD-10-CM

## 2024-08-01 DIAGNOSIS — R74.8 ELEVATED CK: ICD-10-CM

## 2024-08-01 LAB
ALBUMIN SERPL BCP-MCNC: 4.3 G/DL (ref 3.4–5)
ALP SERPL-CCNC: 81 U/L (ref 33–136)
ALT SERPL W P-5'-P-CCNC: 12 U/L (ref 7–45)
ANION GAP SERPL CALC-SCNC: 12 MMOL/L (ref 10–20)
AST SERPL W P-5'-P-CCNC: 18 U/L (ref 9–39)
BASOPHILS # BLD AUTO: 0.03 X10*3/UL (ref 0–0.1)
BASOPHILS NFR BLD AUTO: 0.9 %
BILIRUB SERPL-MCNC: 0.6 MG/DL (ref 0–1.2)
BUN SERPL-MCNC: 16 MG/DL (ref 6–23)
CALCIUM SERPL-MCNC: 9.7 MG/DL (ref 8.6–10.6)
CHLORIDE SERPL-SCNC: 104 MMOL/L (ref 98–107)
CHOLEST SERPL-MCNC: 216 MG/DL (ref 0–199)
CHOLESTEROL/HDL RATIO: 3.4
CK SERPL-CCNC: 353 U/L (ref 0–215)
CO2 SERPL-SCNC: 28 MMOL/L (ref 21–32)
CREAT SERPL-MCNC: 1 MG/DL (ref 0.5–1.05)
EGFRCR SERPLBLD CKD-EPI 2021: 60 ML/MIN/1.73M*2
EOSINOPHIL # BLD AUTO: 0.12 X10*3/UL (ref 0–0.4)
EOSINOPHIL NFR BLD AUTO: 3.8 %
ERYTHROCYTE [DISTWIDTH] IN BLOOD BY AUTOMATED COUNT: 11.9 % (ref 11.5–14.5)
EST. AVERAGE GLUCOSE BLD GHB EST-MCNC: 123 MG/DL
GLUCOSE SERPL-MCNC: 114 MG/DL (ref 74–99)
HBA1C MFR BLD: 5.9 %
HCT VFR BLD AUTO: 39.7 % (ref 36–46)
HDLC SERPL-MCNC: 63.2 MG/DL
HGB BLD-MCNC: 12.5 G/DL (ref 12–16)
IMM GRANULOCYTES # BLD AUTO: 0 X10*3/UL (ref 0–0.5)
IMM GRANULOCYTES NFR BLD AUTO: 0 % (ref 0–0.9)
LDLC SERPL CALC-MCNC: 140 MG/DL
LYMPHOCYTES # BLD AUTO: 1.42 X10*3/UL (ref 0.8–3)
LYMPHOCYTES NFR BLD AUTO: 44.9 %
MCH RBC QN AUTO: 28.3 PG (ref 26–34)
MCHC RBC AUTO-ENTMCNC: 31.5 G/DL (ref 32–36)
MCV RBC AUTO: 90 FL (ref 80–100)
MONOCYTES # BLD AUTO: 0.33 X10*3/UL (ref 0.05–0.8)
MONOCYTES NFR BLD AUTO: 10.4 %
NEUTROPHILS # BLD AUTO: 1.26 X10*3/UL (ref 1.6–5.5)
NEUTROPHILS NFR BLD AUTO: 40 %
NON HDL CHOLESTEROL: 153 MG/DL (ref 0–149)
NRBC BLD-RTO: 0 /100 WBCS (ref 0–0)
PLATELET # BLD AUTO: 242 X10*3/UL (ref 150–450)
POTASSIUM SERPL-SCNC: 4.6 MMOL/L (ref 3.5–5.3)
PROT SERPL-MCNC: 7 G/DL (ref 6.4–8.2)
RBC # BLD AUTO: 4.41 X10*6/UL (ref 4–5.2)
SODIUM SERPL-SCNC: 139 MMOL/L (ref 136–145)
TRIGL SERPL-MCNC: 64 MG/DL (ref 0–149)
TSH SERPL-ACNC: 1.44 MIU/L (ref 0.44–3.98)
VLDL: 13 MG/DL (ref 0–40)
WBC # BLD AUTO: 3.2 X10*3/UL (ref 4.4–11.3)

## 2024-08-01 PROCEDURE — 36415 COLL VENOUS BLD VENIPUNCTURE: CPT

## 2024-08-01 PROCEDURE — 84443 ASSAY THYROID STIM HORMONE: CPT

## 2024-08-01 PROCEDURE — 82550 ASSAY OF CK (CPK): CPT

## 2024-08-01 PROCEDURE — 85025 COMPLETE CBC W/AUTO DIFF WBC: CPT

## 2024-08-01 PROCEDURE — 80061 LIPID PANEL: CPT

## 2024-08-01 PROCEDURE — 80053 COMPREHEN METABOLIC PANEL: CPT

## 2024-08-01 PROCEDURE — 83036 HEMOGLOBIN GLYCOSYLATED A1C: CPT

## 2024-08-16 ENCOUNTER — APPOINTMENT (OUTPATIENT)
Dept: RHEUMATOLOGY | Facility: CLINIC | Age: 74
End: 2024-08-16
Payer: MEDICARE

## 2024-09-03 ENCOUNTER — HOSPITAL ENCOUNTER (OUTPATIENT)
Dept: RADIOLOGY | Facility: CLINIC | Age: 74
Discharge: HOME | End: 2024-09-03
Payer: MEDICARE

## 2024-09-03 VITALS — HEIGHT: 68 IN | BODY MASS INDEX: 25.29 KG/M2 | WEIGHT: 166.89 LBS

## 2024-09-03 DIAGNOSIS — Z12.31 VISIT FOR SCREENING MAMMOGRAM: ICD-10-CM

## 2024-09-03 PROCEDURE — 77067 SCR MAMMO BI INCL CAD: CPT | Performed by: STUDENT IN AN ORGANIZED HEALTH CARE EDUCATION/TRAINING PROGRAM

## 2024-09-03 PROCEDURE — 77063 BREAST TOMOSYNTHESIS BI: CPT | Performed by: STUDENT IN AN ORGANIZED HEALTH CARE EDUCATION/TRAINING PROGRAM

## 2024-09-03 PROCEDURE — 77067 SCR MAMMO BI INCL CAD: CPT

## 2024-09-23 ENCOUNTER — APPOINTMENT (OUTPATIENT)
Dept: RHEUMATOLOGY | Facility: CLINIC | Age: 74
End: 2024-09-23
Payer: MEDICARE

## 2024-09-23 VITALS
HEIGHT: 71 IN | WEIGHT: 164.8 LBS | TEMPERATURE: 97.9 F | DIASTOLIC BLOOD PRESSURE: 80 MMHG | RESPIRATION RATE: 16 BRPM | HEART RATE: 70 BPM | OXYGEN SATURATION: 98 % | SYSTOLIC BLOOD PRESSURE: 127 MMHG | BODY MASS INDEX: 23.07 KG/M2

## 2024-09-23 DIAGNOSIS — E78.5 HYPERLIPIDEMIA, UNSPECIFIED HYPERLIPIDEMIA TYPE: ICD-10-CM

## 2024-09-23 DIAGNOSIS — Z12.11 COLON CANCER SCREENING: ICD-10-CM

## 2024-09-23 DIAGNOSIS — Z00.00 HEALTHCARE MAINTENANCE: Primary | ICD-10-CM

## 2024-09-23 DIAGNOSIS — I10 ESSENTIAL HYPERTENSION: ICD-10-CM

## 2024-09-23 PROCEDURE — G0008 ADMIN INFLUENZA VIRUS VAC: HCPCS | Performed by: INTERNAL MEDICINE

## 2024-09-23 PROCEDURE — 3008F BODY MASS INDEX DOCD: CPT | Performed by: INTERNAL MEDICINE

## 2024-09-23 PROCEDURE — 1157F ADVNC CARE PLAN IN RCRD: CPT | Performed by: INTERNAL MEDICINE

## 2024-09-23 PROCEDURE — 3079F DIAST BP 80-89 MM HG: CPT | Performed by: INTERNAL MEDICINE

## 2024-09-23 PROCEDURE — 99214 OFFICE O/P EST MOD 30 MIN: CPT | Performed by: INTERNAL MEDICINE

## 2024-09-23 PROCEDURE — 3074F SYST BP LT 130 MM HG: CPT | Performed by: INTERNAL MEDICINE

## 2024-09-23 PROCEDURE — 1159F MED LIST DOCD IN RCRD: CPT | Performed by: INTERNAL MEDICINE

## 2024-09-23 PROCEDURE — 1160F RVW MEDS BY RX/DR IN RCRD: CPT | Performed by: INTERNAL MEDICINE

## 2024-09-23 PROCEDURE — 90662 IIV NO PRSV INCREASED AG IM: CPT | Performed by: INTERNAL MEDICINE

## 2024-09-23 NOTE — PROGRESS NOTES
Subjective   Patient ID: Fany Ramirez is a 73 y.o. female who presents for Follow-up (2 month f/u).    HPI  73-year-old female with history of essential hypertension, hyperlipidemia, osteoarthritis, osteopenia, and hyper CKemia who is here for f/u.     Oral steroid taper October 2023 helped with shoulder pain.  She no longer has left shoulder pain.    She has slight neck pain.  Off-and-on cervical spine x-ray done February 2023 shows  Previous fusion at C4-6, degenerative disc disease at C3-4, C4-5, C6-7.    She gets episodic pain of left first CMC joint.  She wears a compression glove at nighttime to sleep which helps.  She notes that her thumb was injured at her job about 15 years ago, when the thumb got hyperextended.  At that time she needed to wear her wrist splint with thumb spica for 6 weeks.     She had transient elevation of her liver enzymes August 2022 (primarily liver transaminases), without clear etiology.  She had no abdominal pain and was otherwise feeling fine.  Liver enzymes repeated October 10, 2022 are normal.  She is known to have gallstones on right upper quadrant ultrasound done in 2016.  She had mildly elevated ALT August 2023.  Hepatic function panel repeated December 2020 was normal.     Because of her impaired fasting glucose, she has been following a low carbohydrate diet.    She exercises on a regular basis.  She tries to take 10,000 steps daily.  She is currently walking on a treadmill for 90 minutes 5 days/week.     She does live with her son-in-law, daughter and 2 grandchildren.    She has a living well.  She is currently a full code.  Healthcare power of  is her daughter Patricia Mercado ([phone]), second agent is Juancarlos Knutson ([phone]).     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  EKG  "2/13/20: Sinus arrhythmia, otherwise normal.  EKG October 8, 2021: Normal     DEXA 4/18: T score -1.4 femoral neck, normal lumbar spine.  DEXA 1/22: T score -1.1 left femoral neck (increased 5.1%), T score normal total hip, T score normal lumbar spine.     Labs 8/23: Seen CMP normal except glucose 106 and GFR 59 and ALT 53, CK2 76, CBC normal except white blood cell count 3.3, cholesterol 203, HDL 55, , triglycerides 171  Labs 12/23: Hepatic function panel normal,   Labs 8/24: CK3 53 (normal 0-2 15), TSH 1.44, CBC normal except white blood cell count 3.2, CMP normal except glucose 114 and GFR 60, hemoglobin A1c 5.9, cholesterol 216, HDL 63, , triglycerides 64     Cervical spine x-ray done February 2023: Evidence of anterior fusion C4-C6. There is disc height loss with spurring at C3-4, C6-7, and .-T1.  Lumbar spine x-ray done 2/23: Further disc space height loss at L4-S1 with spinal and neural foraminal stenosis at L4-S1, progressed from previous exam.     Health maintenance:   Mammogram 9/03/24   Colonoscopy 11/14- needs 10 year follow-up   Tdap 11/12   HD Flu shot 9/23/24   Zostavax 11/15   Prevnar-13 3/18   Pneumovax 6/19   Shingrix #1 December 2018, #2 3/19   Pfizer COVID-19 vaccine 3/27/21. 4/14/21, booster 11/28/21, booster 7/02/22  Pfizer COVID-vaccine October 27, 2023  Eye exam 8/23- [facility]       ROS:  General: Denies fevers or chills.  CV: Denies chest pain or palpitations.  Denies leg edema.  Lungs: Denies coughing or shortness of breath.  Skin: Denies rashes or nodules.  MS: Denies joint pain or stiffness.      Objective   /80 (BP Location: Right arm, Patient Position: Sitting, BP Cuff Size: Adult)   Pulse 70   Temp 36.6 °C (97.9 °F) (Skin)   Resp 16   Ht 1.803 m (5' 11\")   Wt 74.8 kg (164 lb 12.8 oz)   SpO2 98%   BMI 22.98 kg/m²     Physical Exam  HEENT: PERRL, EOMI  Neck: Supple, no nodes.  CV: RRR, no MGR.  Lungs: Clear, no rales or wheezes.  Abdomen: Soft, " nontender. No hepatosplenomegaly.  Extremities:  No cyanosis, clubbing, or edema.   MS: No synovitis.      Assessment/Plan   Problem List Items Addressed This Visit             ICD-10-CM    Essential hypertension I10    Hyperlipidemia E78.5    BMI 22.0-22.9, adult Z68.22     Other Visit Diagnoses         Codes    Healthcare maintenance    -  Primary Z00.00    Relevant Orders    Flu vaccine, trivalent, preservative free, HIGH-DOSE, age 65y+ (Fluzone) (Completed)    Colon cancer screening     Z12.11    Relevant Orders    Colonoscopy Screening; Average Risk Patient              Essential hypertension-currently well-controlled.    Elevated CK-felt due to benign hyper CK anemia.  Aug 2024.    Hyperlipidemia-reasonable control 8/24 with Zetia 10 mg daily.    Impaired fasting glucose-currently working on low carbohydrate diet. HbA1C 5.9 Aug 2024.    BMI 22- improved.    Right rotator cuff arthropathy-pain comes and goes.  She will continue using Biofreeze as needed.    Medicare wellness visit done 2/22/24.    ACP discussed 2/24- she has living will. HPOA is daughter Patricia Mercado ([phone]), 2nd agent Juancarlos Lopezlindsey ([phone]). She is full code.    OA left first CMC joint-recommend Voltaren gel pea-sized amount 4 times daily as needed.  She can continue to wear compression glove as needed.    Plan:  Colonoscopy ordered.  High dose flu shot given.  You can get COVID vaccine in 2 weeks.  Follow-up in 4 months.

## 2024-09-23 NOTE — PATIENT INSTRUCTIONS
Colonoscopy ordered.  High dose flu shot given.  You can get COVID vaccine in 2 weeks.  Follow-up in 4 months.

## 2024-09-24 DIAGNOSIS — Z12.11 COLON CANCER SCREENING: ICD-10-CM

## 2024-09-24 RX ORDER — POLYETHYLENE GLYCOL 3350, SODIUM CHLORIDE, SODIUM BICARBONATE, POTASSIUM CHLORIDE 420; 11.2; 5.72; 1.48 G/4L; G/4L; G/4L; G/4L
4000 POWDER, FOR SOLUTION ORAL ONCE
Qty: 4000 ML | Refills: 0 | Status: SHIPPED | OUTPATIENT
Start: 2024-09-24 | End: 2024-09-24

## 2024-10-03 ENCOUNTER — TELEPHONE (OUTPATIENT)
Dept: PRIMARY CARE | Facility: CLINIC | Age: 74
End: 2024-10-03
Payer: MEDICARE

## 2024-10-03 NOTE — TELEPHONE ENCOUNTER
Pt has a colonoscopy  on 10/18/24 and wanted to know if she should stop any of her medications and if so how long should she be off them. Please advise

## 2024-10-18 ENCOUNTER — APPOINTMENT (OUTPATIENT)
Dept: GASTROENTEROLOGY | Facility: EXTERNAL LOCATION | Age: 74
End: 2024-10-18
Payer: MEDICARE

## 2024-10-18 VITALS
OXYGEN SATURATION: 95 % | SYSTOLIC BLOOD PRESSURE: 126 MMHG | RESPIRATION RATE: 15 BRPM | TEMPERATURE: 96.8 F | HEART RATE: 87 BPM | DIASTOLIC BLOOD PRESSURE: 72 MMHG

## 2024-10-18 DIAGNOSIS — Z12.11 COLON CANCER SCREENING: Primary | ICD-10-CM

## 2024-10-18 PROCEDURE — 45385 COLONOSCOPY W/LESION REMOVAL: CPT | Performed by: INTERNAL MEDICINE

## 2024-10-18 ASSESSMENT — PAIN SCALES - GENERAL
PAINLEVEL_OUTOF10: 0 - NO PAIN

## 2024-10-18 ASSESSMENT — COLUMBIA-SUICIDE SEVERITY RATING SCALE - C-SSRS
2. HAVE YOU ACTUALLY HAD ANY THOUGHTS OF KILLING YOURSELF?: NO
1. IN THE PAST MONTH, HAVE YOU WISHED YOU WERE DEAD OR WISHED YOU COULD GO TO SLEEP AND NOT WAKE UP?: NO
6. HAVE YOU EVER DONE ANYTHING, STARTED TO DO ANYTHING, OR PREPARED TO DO ANYTHING TO END YOUR LIFE?: NO

## 2024-10-18 ASSESSMENT — PAIN - FUNCTIONAL ASSESSMENT
PAIN_FUNCTIONAL_ASSESSMENT: 0-10

## 2024-10-18 NOTE — H&P
Procedure H&P    Patient Profile-Procedures  Name Fany Ramirez  Date of Birth 1950  MRN [id number]  Address   [address] [facility] [id number] [facility] [id number]    Primary Phone Number [phone]  Secondary Phone Number    Ce Orozco    Procedure(s):  Procedures: Colonoscopy  Primary contact name and number   Extended Emergency Contact Information  Primary Emergency Contact: Patricia Mercado  Mobile Phone: [phone]  Relation: Child    General Health  Weight There were no vitals filed for this visit.  BMI There is no height or weight on file to calculate BMI.    Allergies  No Known Allergies    Past Medical History   Past Medical History:   Diagnosis Date    Personal history of other diseases of the circulatory system     History of hypertension    Personal history of other diseases of the musculoskeletal system and connective tissue     History of arthritis       Provider assessment  Diagnosis: Colon Cancer Screening/Surveillance   Medication Reviewed - yes  Prior to Admission medications    Medication Sig Start Date End Date Taking? Authorizing Provider   ezetimibe (Zetia) 10 mg tablet Take 1 tablet (10 mg) by mouth once daily. 11/22/23  Yes Ce Guardado MD   gabapentin (Neurontin) 300 mg capsule Take 1 capsule (300 mg) by mouth once daily at bedtime. 6/17/24  Yes Ce Guardado MD   lisinopril 20 mg tablet TAKE 1 TABLET BY MOUTH DAILY 5/31/24  Yes Ce Guardado MD   nortriptyline (Pamelor) 50 mg capsule TAKE 1 CAPSULE BY MOUTH ONCE  DAILY AT BEDTIME 5/28/24  Yes Ce Guardado MD   potassium chloride CR 20 mEq ER tablet TAKE 1 TABLET BY MOUTH DAILY 12/26/23  Yes Marlene Rodriguez MD   spironolactone (Aldactone) 25 mg tablet TAKE 1 TABLET BY MOUTH DAILY 6/10/24  Yes Ce Guardado MD   triamcinolone (Kenalog) 0.1 % oral paste Take by mouth. APPLY SPARINGLY 2 to 4 TIMES A DAY 10/15/20  Yes Historical Provider, MD       Physical  Exam  There were no vitals filed for this visit.     General: A&Ox3, NAD.  HEENT: AT/NC.   CV: RRR. No murmur.  Resp: CTA bilaterally. No wheezing, rhonchi or rales.   GI: Soft, NT/ND. BSx4.  Extrem: No edema. Pulses intact.  Neuro: No focal deficits.   Psych: Normal mood and affect.      Procedure Plan - pre-procedural (re)assesment completed by physician:  discharge/transfer patient when discharge criteria met    ASA status 2  Mallampati score 2    Anton Mercer MD  10/18/2024 1:00 PM

## 2024-10-18 NOTE — DISCHARGE INSTRUCTIONS

## 2024-10-23 DIAGNOSIS — I10 ESSENTIAL HYPERTENSION: ICD-10-CM

## 2024-10-24 RX ORDER — POTASSIUM CHLORIDE 20 MEQ/1
20 TABLET, EXTENDED RELEASE ORAL DAILY
Qty: 90 TABLET | Refills: 1 | Status: SHIPPED | OUTPATIENT
Start: 2024-10-24

## 2024-10-25 DIAGNOSIS — I10 PRIMARY HYPERTENSION: ICD-10-CM

## 2024-10-25 DIAGNOSIS — M79.7 FIBROMYALGIA: ICD-10-CM

## 2024-10-25 DIAGNOSIS — E78.5 HYPERLIPIDEMIA, UNSPECIFIED HYPERLIPIDEMIA TYPE: ICD-10-CM

## 2024-10-25 LAB
LABORATORY COMMENT REPORT: NORMAL
PATH REPORT.FINAL DX SPEC: NORMAL
PATH REPORT.GROSS SPEC: NORMAL
PATH REPORT.TOTAL CANCER: NORMAL

## 2024-10-25 RX ORDER — LISINOPRIL 20 MG/1
20 TABLET ORAL DAILY
Qty: 90 TABLET | Refills: 1 | Status: SHIPPED | OUTPATIENT
Start: 2024-10-25

## 2024-10-25 RX ORDER — SPIRONOLACTONE 25 MG/1
25 TABLET ORAL DAILY
Qty: 90 TABLET | Refills: 1 | Status: SHIPPED | OUTPATIENT
Start: 2024-10-25

## 2024-10-25 RX ORDER — NORTRIPTYLINE HYDROCHLORIDE 50 MG/1
50 CAPSULE ORAL
Qty: 90 CAPSULE | Refills: 1 | Status: SHIPPED | OUTPATIENT
Start: 2024-10-25

## 2024-10-25 RX ORDER — EZETIMIBE 10 MG/1
10 TABLET ORAL DAILY
Qty: 90 TABLET | Refills: 1 | Status: SHIPPED | OUTPATIENT
Start: 2024-10-25

## 2024-10-25 NOTE — TELEPHONE ENCOUNTER
Patient is requesting a refill on 4 medications  Zetia 10mg   Pamelor 50mg   Lisinopril 20mg   Aldactone  25mg   Last office visit: 09/23/2024

## 2025-01-23 ENCOUNTER — APPOINTMENT (OUTPATIENT)
Dept: RHEUMATOLOGY | Facility: CLINIC | Age: 75
End: 2025-01-23
Payer: MEDICARE

## 2025-01-23 VITALS
RESPIRATION RATE: 14 BRPM | TEMPERATURE: 97.9 F | OXYGEN SATURATION: 97 % | HEIGHT: 71 IN | BODY MASS INDEX: 22.51 KG/M2 | DIASTOLIC BLOOD PRESSURE: 73 MMHG | SYSTOLIC BLOOD PRESSURE: 119 MMHG | WEIGHT: 160.8 LBS | HEART RATE: 99 BPM

## 2025-01-23 DIAGNOSIS — I10 ESSENTIAL HYPERTENSION: Primary | ICD-10-CM

## 2025-01-23 DIAGNOSIS — K64.9 BLEEDING HEMORRHOID: ICD-10-CM

## 2025-01-23 PROCEDURE — 1036F TOBACCO NON-USER: CPT | Performed by: INTERNAL MEDICINE

## 2025-01-23 PROCEDURE — 99214 OFFICE O/P EST MOD 30 MIN: CPT | Performed by: INTERNAL MEDICINE

## 2025-01-23 PROCEDURE — 1160F RVW MEDS BY RX/DR IN RCRD: CPT | Performed by: INTERNAL MEDICINE

## 2025-01-23 PROCEDURE — 1159F MED LIST DOCD IN RCRD: CPT | Performed by: INTERNAL MEDICINE

## 2025-01-23 PROCEDURE — 3074F SYST BP LT 130 MM HG: CPT | Performed by: INTERNAL MEDICINE

## 2025-01-23 PROCEDURE — 3078F DIAST BP <80 MM HG: CPT | Performed by: INTERNAL MEDICINE

## 2025-01-23 PROCEDURE — 1157F ADVNC CARE PLAN IN RCRD: CPT | Performed by: INTERNAL MEDICINE

## 2025-01-23 PROCEDURE — 3008F BODY MASS INDEX DOCD: CPT | Performed by: INTERNAL MEDICINE

## 2025-01-23 PROCEDURE — 1126F AMNT PAIN NOTED NONE PRSNT: CPT | Performed by: INTERNAL MEDICINE

## 2025-01-23 ASSESSMENT — PATIENT HEALTH QUESTIONNAIRE - PHQ9
1. LITTLE INTEREST OR PLEASURE IN DOING THINGS: NOT AT ALL
SUM OF ALL RESPONSES TO PHQ9 QUESTIONS 1 AND 2: 0
2. FEELING DOWN, DEPRESSED OR HOPELESS: NOT AT ALL

## 2025-01-23 ASSESSMENT — PAIN SCALES - GENERAL: PAINLEVEL_OUTOF10: 0-NO PAIN

## 2025-01-23 NOTE — PROGRESS NOTES
Subjective   Patient ID: Fany Ramirez is a 74 y.o. female who presents for Follow-up (4 month f/u).    HPI  74-year-old female with history of essential hypertension, hyperlipidemia, osteoarthritis, osteopenia, and hyper CKemia who is here for f/u.     Colonoscopy done October 2024 showed a 4 mm polyp in the transverse colon which was a tubular adenoma.  7-year follow-up was recommended.    Patient does struggle with chronic constipation.  She takes a generic version of MiraLAX called ClearLax every other day, under the advice of a GI person she saw several years ago.    She started the Atkins diet around Corey time, and noted that she became more constipated.  She experienced rectal bleeding January 21, 2025.  She had a bowel movement where she noticed blood in the toilet bowl and blood in stool.  She went to the emergency room for evaluation.  CBC was normal.  It was likely felt to be hemorrhoidal bleeding.  She notes that her stool today was back to normal.    Cervical spine x-ray done February 2023 shows  Previous fusion at C4-6, degenerative disc disease at C3-4, C4-5, C6-7.    She gets episodic pain of left first CMC joint.  She wears a compression glove at nighttime to sleep which helps.  She notes that her thumb was injured at her job about 15 years ago, when the thumb got hyperextended.  At that time she needed to wear her wrist splint with thumb spica for 6 weeks.     She had transient elevation of her liver enzymes August 2022 (primarily liver transaminases), without clear etiology.  She had no abdominal pain and was otherwise feeling fine.  Liver enzymes repeated October 10, 2022 are normal.  She is known to have gallstones on right upper quadrant ultrasound done in 2016.  She had mildly elevated ALT August 2023.  Hepatic function panel repeated 1/25 was normal.     Because of her impaired fasting glucose, she has been following a low carbohydrate diet.    She exercises on a regular basis.  She tries  to take 10,000 steps daily.  She is currently walking on a treadmill for 90 minutes 5 days/week.     She does live with her son-in-law, daughter and 2 grandchildren.    She has a living well.  She is currently a full code.  Healthcare power of  is her daughter Patricia Mercado ([phone]), second agent is Juancarlos Knutson ([phone]).     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  EKG 2/13/20: Sinus arrhythmia, otherwise normal.  EKG October 8, 2021: Normal     DEXA 4/18: T score -1.4 femoral neck, normal lumbar spine.  DEXA 1/22: T score -1.1 left femoral neck (increased 5.1%), T score normal total hip, T score normal lumbar spine.     Labs 8/24: CK3 53 (normal 0-2 15), TSH 1.44, CBC normal except white blood cell count 3.2, CMP normal except glucose 114 and GFR 60, hemoglobin A1c 5.9, cholesterol 216, HDL 63, , triglycerides 64  Labs January 2025: CMP normal except glucose 106 and sodium 134, CBC normal     Cervical spine x-ray done February 2023: Evidence of anterior fusion C4-C6. There is disc height loss with spurring at C3-4, C6-7, and .-T1.  Lumbar spine x-ray done 2/23: Further disc space height loss at L4-S1 with spinal and neural foraminal stenosis at L4-S1, progressed from previous exam.     Health maintenance:   Mammogram 9/03/24   Colonoscopy 11/14- needs 10 year follow-up   Tdap 11/12   HD Flu shot 9/23/24  Moderna COVID vaccine 10/21/25   Zostavax 11/15   Prevnar-13 3/18   Pneumovax 6/19   Shingrix #1 December 2018, #2 3/19   Eye exam 8/23- [facility]       ROS:  General: Denies fevers or chills.  CV: Denies chest pain or palpitations.  Denies leg edema.  Lungs: Denies coughing or shortness of breath.  Skin: Denies rashes or nodules.  MS: Denies joint pain or stiffness.      Objective   BP  "119/73 (BP Location: Left arm, Patient Position: Sitting, BP Cuff Size: Adult)   Pulse 99   Temp 36.6 °C (97.9 °F) (Skin)   Resp 14   Ht 1.803 m (5' 11\")   Wt 72.9 kg (160 lb 12.8 oz)   SpO2 97%   BMI 22.43 kg/m²     Physical Exam  General appearance: Well-nourished and well-appearing.  HEENT: PERRL, EOMI  Neck: Supple, no nodes.  CV: RRR, no MGR.  Lungs: Clear, no rales or wheezes.  Abdomen: Soft, nontender. No hepatosplenomegaly.  Extremities:  No cyanosis, clubbing, or edema.   MS: No synovitis.      Assessment/Plan   Problem List Items Addressed This Visit             ICD-10-CM    Essential hypertension - Primary I10    BMI 22.0-22.9, adult Z68.22    Bleeding hemorrhoid K64.9         Essential hypertension-currently well-controlled.    Elevated CK-felt due to benign hyper CK anemia.  Aug 2024.    Hyperlipidemia-reasonable control 8/24 with Zetia 10 mg daily.    Impaired fasting glucose-currently working on low carbohydrate diet. HbA1C 5.9 Aug 2024.    BMI 22- stable.    Right rotator cuff arthropathy-pain comes and goes.  She will continue using Biofreeze as needed.    Medicare wellness visit done 2/22/24.  Medicare wellness visit will be done at next visit in 4 months.    ACP discussed 2/24- she has living will. HPOA is daughter Patricia Mercado ([phone]), 2nd agent Juancarlos Knutson ([phone]). She is full code.    OA left first CMC joint-recommend Voltaren gel pea-sized amount 4 times daily as needed.  She can continue to wear compression glove as needed.    Rectal bleeding 1/21/25-probable hemorrhoidal bleeding.  Bowel movement today was back to normal.  I advised her to continue with MiraLAX every other day to assist with her chronic constipation.  She is going to stop the Atkins diet, which she thinks made her more constipated.    Plan:  Continue Miralax or Clearlax every other day, as you are doing.  Follow up in 4 months for Medicare Wellness visit.                 "

## 2025-01-23 NOTE — PATIENT INSTRUCTIONS
Continue Miralax or Clearlax every other day, as you are doing.  Follow up in 4 months for Medicare Wellness visit.

## 2025-02-13 DIAGNOSIS — M54.12 CERVICAL RADICULOPATHY: ICD-10-CM

## 2025-02-13 RX ORDER — GABAPENTIN 300 MG/1
300 CAPSULE ORAL
Qty: 90 CAPSULE | Refills: 1 | Status: SHIPPED | OUTPATIENT
Start: 2025-02-13

## 2025-04-07 DIAGNOSIS — M79.7 FIBROMYALGIA: ICD-10-CM

## 2025-04-07 DIAGNOSIS — I10 PRIMARY HYPERTENSION: ICD-10-CM

## 2025-04-08 RX ORDER — NORTRIPTYLINE HYDROCHLORIDE 50 MG/1
50 CAPSULE ORAL
Qty: 90 CAPSULE | Refills: 1 | Status: SHIPPED | OUTPATIENT
Start: 2025-04-08

## 2025-04-08 RX ORDER — LISINOPRIL 20 MG/1
20 TABLET ORAL DAILY
Qty: 90 TABLET | Refills: 1 | Status: SHIPPED | OUTPATIENT
Start: 2025-04-08

## 2025-04-08 NOTE — TELEPHONE ENCOUNTER
Patient is requesting a refill on:    Lisinopril 20 mg   Nortriptyline 50 mg     Last office visit: 01/23/2025

## 2025-04-15 DIAGNOSIS — I10 PRIMARY HYPERTENSION: ICD-10-CM

## 2025-04-15 DIAGNOSIS — E78.5 HYPERLIPIDEMIA, UNSPECIFIED HYPERLIPIDEMIA TYPE: ICD-10-CM

## 2025-04-15 DIAGNOSIS — I10 ESSENTIAL HYPERTENSION: ICD-10-CM

## 2025-04-16 RX ORDER — EZETIMIBE 10 MG/1
10 TABLET ORAL DAILY
Qty: 90 TABLET | Refills: 1 | Status: SHIPPED | OUTPATIENT
Start: 2025-04-16

## 2025-04-16 RX ORDER — SPIRONOLACTONE 25 MG/1
25 TABLET ORAL DAILY
Qty: 90 TABLET | Refills: 1 | Status: SHIPPED | OUTPATIENT
Start: 2025-04-16

## 2025-04-16 RX ORDER — POTASSIUM CHLORIDE 20 MEQ/1
20 TABLET, EXTENDED RELEASE ORAL DAILY
Qty: 90 TABLET | Refills: 1 | Status: SHIPPED | OUTPATIENT
Start: 2025-04-16

## 2025-04-16 NOTE — TELEPHONE ENCOUNTER
Patient is requesting a refill on:  Ezetimibe 10 mg   Potassium Chloride 20 mEq ER tablet  Spironolactone 25 mg tablet       Last office visit: 01/23/2025

## 2025-05-27 ENCOUNTER — APPOINTMENT (OUTPATIENT)
Dept: RHEUMATOLOGY | Facility: CLINIC | Age: 75
End: 2025-05-27
Payer: MEDICARE

## 2025-05-27 VITALS
HEIGHT: 71 IN | BODY MASS INDEX: 22.96 KG/M2 | OXYGEN SATURATION: 99 % | DIASTOLIC BLOOD PRESSURE: 68 MMHG | WEIGHT: 164 LBS | SYSTOLIC BLOOD PRESSURE: 114 MMHG | HEART RATE: 96 BPM | RESPIRATION RATE: 16 BRPM

## 2025-05-27 DIAGNOSIS — Z12.31 VISIT FOR SCREENING MAMMOGRAM: ICD-10-CM

## 2025-05-27 DIAGNOSIS — R74.8 ELEVATED CK: ICD-10-CM

## 2025-05-27 DIAGNOSIS — R73.01 IMPAIRED FASTING GLUCOSE: ICD-10-CM

## 2025-05-27 DIAGNOSIS — L91.8 SKIN TAG: ICD-10-CM

## 2025-05-27 DIAGNOSIS — I10 ESSENTIAL HYPERTENSION: ICD-10-CM

## 2025-05-27 DIAGNOSIS — E78.5 HYPERLIPIDEMIA, UNSPECIFIED HYPERLIPIDEMIA TYPE: Primary | ICD-10-CM

## 2025-05-27 PROCEDURE — 1170F FXNL STATUS ASSESSED: CPT | Performed by: INTERNAL MEDICINE

## 2025-05-27 PROCEDURE — G0439 PPPS, SUBSEQ VISIT: HCPCS | Performed by: INTERNAL MEDICINE

## 2025-05-27 PROCEDURE — 3008F BODY MASS INDEX DOCD: CPT | Performed by: INTERNAL MEDICINE

## 2025-05-27 PROCEDURE — G0444 DEPRESSION SCREEN ANNUAL: HCPCS | Performed by: INTERNAL MEDICINE

## 2025-05-27 PROCEDURE — 1160F RVW MEDS BY RX/DR IN RCRD: CPT | Performed by: INTERNAL MEDICINE

## 2025-05-27 PROCEDURE — G0446 INTENS BEHAVE THER CARDIO DX: HCPCS | Performed by: INTERNAL MEDICINE

## 2025-05-27 PROCEDURE — 3078F DIAST BP <80 MM HG: CPT | Performed by: INTERNAL MEDICINE

## 2025-05-27 PROCEDURE — 99497 ADVNCD CARE PLAN 30 MIN: CPT | Performed by: INTERNAL MEDICINE

## 2025-05-27 PROCEDURE — 1036F TOBACCO NON-USER: CPT | Performed by: INTERNAL MEDICINE

## 2025-05-27 PROCEDURE — 99214 OFFICE O/P EST MOD 30 MIN: CPT | Performed by: INTERNAL MEDICINE

## 2025-05-27 PROCEDURE — 3074F SYST BP LT 130 MM HG: CPT | Performed by: INTERNAL MEDICINE

## 2025-05-27 PROCEDURE — 93000 ELECTROCARDIOGRAM COMPLETE: CPT | Performed by: INTERNAL MEDICINE

## 2025-05-27 PROCEDURE — 1159F MED LIST DOCD IN RCRD: CPT | Performed by: INTERNAL MEDICINE

## 2025-05-27 ASSESSMENT — PATIENT HEALTH QUESTIONNAIRE - PHQ9
2. FEELING DOWN, DEPRESSED OR HOPELESS: NOT AT ALL
SUM OF ALL RESPONSES TO PHQ9 QUESTIONS 1 AND 2: 0
1. LITTLE INTEREST OR PLEASURE IN DOING THINGS: NOT AT ALL

## 2025-05-27 ASSESSMENT — ENCOUNTER SYMPTOMS
LOSS OF SENSATION IN FEET: 0
DEPRESSION: 0
OCCASIONAL FEELINGS OF UNSTEADINESS: 0

## 2025-05-27 ASSESSMENT — ACTIVITIES OF DAILY LIVING (ADL)
DRESSING: INDEPENDENT
BATHING: INDEPENDENT
MANAGING_FINANCES: INDEPENDENT
GROCERY_SHOPPING: INDEPENDENT
TAKING_MEDICATION: INDEPENDENT
DOING_HOUSEWORK: INDEPENDENT

## 2025-05-27 NOTE — PATIENT INSTRUCTIONS
Check fasting labs 7/25: CMP, CBC with diff, TSH, lipids, CK.  150 minutes of moderate exercise per week is recommended for heart health.  Mediterranean diet is considered to be a heart healthy diet.   If you get a puncture wound, your tetanus shot needs to be updated.  Mammogram can be done on or after September 4, 2025.  Follow-up in 4 months.

## 2025-05-27 NOTE — PROGRESS NOTES
Subjective   Patient ID: Fany Ramirez is a 74 y.o. female who presents for Medicare Annual Wellness Visit Subsequent.    HPI  74-year-old female with history of essential hypertension, hyperlipidemia, osteoarthritis, osteopenia, and hyper CKemia who is here for f/u.     Colonoscopy done October 2024 showed a 4 mm polyp in the transverse colon which was a tubular adenoma.  7-year follow-up was recommended.    Patient does struggle with chronic constipation.  She takes a generic version of MiraLAX called ClearLax every other day, under the advice of a GI person she saw several years ago.    Cervical spine x-ray done February 2023 shows  Previous fusion at C4-6, degenerative disc disease at C3-4, C4-5, C6-7.    She gets episodic pain of left first CMC joint.  She wears a compression glove at nighttime to sleep which helps.  She gets episodic right shoulder pain and episodic knee pain, for which she uses Biofreeze topically which helps .    Because of her impaired fasting glucose, she has been following a low carbohydrate diet.    She exercises on a regular basis.  She tries to take 10,000 steps daily.  She is currently walking on a treadmill for 90 minutes 7 days/week.     She does live with her son-in-law, daughter and 2 grandchildren.    She has a living well.  She is currently a full code.  Healthcare power of  is her daughter Patricia Mercado ([phone]), second agent is Juancarlos Knutson ([phone]).     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  EKG 2/13/20: Sinus arrhythmia, otherwise normal.  EKG October 8, 2021: Normal  EKG 5/27/25: Sinus arrhythmia, otherwise normal.     DEXA 4/18: T score -1.4 femoral neck, normal lumbar spine.  DEXA 1/22: T score -1.1 left femoral neck (increased 5.1%), T score normal total hip, T  "score normal lumbar spine.     Labs 8/24: CK3 53 (normal 0-2 15), TSH 1.44, CBC normal except white blood cell count 3.2, CMP normal except glucose 114 and GFR 60, hemoglobin A1c 5.9, cholesterol 216, HDL 63, , triglycerides 64  Labs January 2025: CMP normal except glucose 106 and sodium 134, CBC normal     Cervical spine x-ray done February 2023: Evidence of anterior fusion C4-C6. There is disc height loss with spurring at C3-4, C6-7, and .-T1.  Lumbar spine x-ray done 2/23: Further disc space height loss at L4-S1 with spinal and neural foraminal stenosis at L4-S1, progressed from previous exam.     Health maintenance:   Mammogram 9/03/24   Colonoscopy 10/24- needs 7 year follow-up (4 mm polyp and hemorrhoids)   Tdap 11/12   HD Flu shot 9/23/24  Moderna COVID vaccine 10/21/25   Zostavax 11/15   Prevnar-13 3/18   Pneumovax 6/19   Shingrix #1 December 2018, #2 3/19   Eye exam 8/23- [facility]  DEXA 2/22: T score -1.1 left FN, normal left TH, normal LS spine       ROS:  Constitutional: Denies fever, chills, fatigue or weight loss.  Eyes: Denies red eyes, dry eyes, painful eyes, or blurred vision.  ENT: Denies sore throat or hoarseness.  Cardiovascular: Denies chest pain, palpitations, or ankle edema.  Respiratory: Denies cough or shortness of breath.  Gastrointestinal: Denies change in bowel habits, denies diarrhea, denies constipation, denies melena or bright red blood per rectum, denies abdominal pain.  Musculoskeletal: Gets intermittent pain in her right shoulder and both knees, for which she uses Biofreeze topically.  Skin: Denies rashes, nodules or other skin lesions.  Neurologic: Denies headaches, dizziness, weakness numbness or tingling.  Psychiatric: Denies confusion or sleep disturbance.  Endocrine: Denies goiter or thyroid disorder.      Objective   /68   Pulse 96   Resp 16   Ht 1.803 m (5' 11\")   Wt 74.4 kg (164 lb)   SpO2 99%   BMI 22.87 kg/m²     Physical Exam  General " appearance: Well-nourished and well-appearing.  HEENT: PERRL, EOMI  Neck: Supple, no nodes. Thyroid normal.  CV: RRR, no MGR.  Lungs: Clear, no rales or wheezes.  Abdomen: Soft, nontender. No hepatosplenomegaly.  Extremities:  No cyanosis, clubbing, or edema.   MS: No synovitis.    Skin: Skin tag on left posterior thigh.  LN: No cervical, supraclavicular, or axillary lymphadenopathy.    Assessment/Plan   Problem List Items Addressed This Visit           ICD-10-CM    Elevated CK R74.8    Relevant Orders    CK    Essential hypertension I10    Relevant Orders    Comprehensive metabolic panel    CBC and Auto Differential    ECG 12 lead (Clinic Performed)    Hyperlipidemia - Primary E78.5    Relevant Orders    Lipid panel    Tsh With Reflex To Free T4 If Abnormal    Impaired fasting glucose R73.01    Relevant Orders    Comprehensive metabolic panel    BMI 22.0-22.9, adult Z68.22     Other Visit Diagnoses         Codes      Visit for screening mammogram     Z12.31    Relevant Orders    BI mammo bilateral screening tomosynthesis      Skin tag     L91.8    Relevant Orders    Referral to Dermatology          Essential hypertension-currently well-controlled.    Elevated CK-felt due to benign hyper CK anemia.  Aug 2024.    Hyperlipidemia-reasonable control 8/24 with Zetia 10 mg daily.    Impaired fasting glucose-currently working on low carbohydrate diet. HbA1C 5.9 Aug 2024.    BMI 22- stable.    Right rotator cuff arthropathy-pain comes and goes.  She will continue using Biofreeze as needed.    Medicare wellness visit done 5/27/25.       ACP discussed 5/27/25- More than 16 minutes was spent discussing this. She has living will. HPOA is daughter Patricia Mercado ([phone]), 2nd agent Juancarlos Knutson ([phone]). She is full code.    Depression screen-more than 5 minutes was spent doing depression screen using PHQ 2 screening tool.  Depression screen was negative.    Cardiovascular risk screening-more than 15 minutes  was spent doing cardiovascular screening . Cardiovascular risk discussed and, if needed, lifestyle modifications recommended, including nutritional choices, exercise, and elimination of habits contributing risk.  We agreed on a plan to reduce the current cardiovascular risk.  Aspirin use/disuse was discussed after reviewing updated guidelines-she does not meet current guidelines for use of prophylactic baby aspirin.  ASCVD risk score is 12.5% (using ACC/AHA risk calculator).  She does take Zetia 10 mg daily. (Does not take a statin due to chronically elevated CK).  She currently walks 90 minutes on her treadmill 7 days/week.  Mediterranean type diet was recommended.    OA left first CMC joint-recommend Voltaren gel pea-sized amount 4 times daily as needed.  She can continue to wear compression glove as needed.    Rectal bleeding 1/21/25-probable hemorrhoidal bleeding.  Bowel movement today was back to normal.  I advised her to continue with MiraLAX every other day to assist with her chronic constipation.      Plan:  Check fasting labs 7/25: CMP, CBC with diff, TSH, lipids, CK.  150 minutes of moderate exercise per week is recommended for heart health.  Mediterranean diet is considered to be a heart healthy diet.   If you get a puncture wound, your tetanus shot needs to be updated.  Mammogram can be done on or after September 4, 2025.  Follow-up in 4 months.

## 2025-07-04 LAB
ALBUMIN SERPL-MCNC: 4.4 G/DL (ref 3.6–5.1)
ALP SERPL-CCNC: 88 U/L (ref 37–153)
ALT SERPL-CCNC: 12 U/L (ref 6–29)
ANION GAP SERPL CALCULATED.4IONS-SCNC: 7 MMOL/L (CALC) (ref 7–17)
AST SERPL-CCNC: 20 U/L (ref 10–35)
BASOPHILS # BLD AUTO: 29 CELLS/UL (ref 0–200)
BASOPHILS NFR BLD AUTO: 1.1 %
BILIRUB SERPL-MCNC: 0.6 MG/DL (ref 0.2–1.2)
BUN SERPL-MCNC: 15 MG/DL (ref 7–25)
CALCIUM SERPL-MCNC: 9.1 MG/DL (ref 8.6–10.4)
CHLORIDE SERPL-SCNC: 104 MMOL/L (ref 98–110)
CHOLEST SERPL-MCNC: 215 MG/DL
CHOLEST/HDLC SERPL: 3.5 (CALC)
CK SERPL-CCNC: 496 U/L (ref 18–225)
CO2 SERPL-SCNC: 28 MMOL/L (ref 20–32)
CREAT SERPL-MCNC: 0.99 MG/DL (ref 0.6–1)
EGFRCR SERPLBLD CKD-EPI 2021: 61 ML/MIN/1.73M2
EOSINOPHIL # BLD AUTO: 117 CELLS/UL (ref 15–500)
EOSINOPHIL NFR BLD AUTO: 4.5 %
ERYTHROCYTE [DISTWIDTH] IN BLOOD BY AUTOMATED COUNT: 12.4 % (ref 11–15)
GLUCOSE SERPL-MCNC: 108 MG/DL (ref 65–99)
HCT VFR BLD AUTO: 38.8 % (ref 35–45)
HDLC SERPL-MCNC: 61 MG/DL
HGB BLD-MCNC: 12.2 G/DL (ref 11.7–15.5)
LDLC SERPL CALC-MCNC: 137 MG/DL (CALC)
LYMPHOCYTES # BLD AUTO: 1053 CELLS/UL (ref 850–3900)
LYMPHOCYTES NFR BLD AUTO: 40.5 %
MCH RBC QN AUTO: 27.9 PG (ref 27–33)
MCHC RBC AUTO-ENTMCNC: 31.4 G/DL (ref 32–36)
MCV RBC AUTO: 88.6 FL (ref 80–100)
MONOCYTES # BLD AUTO: 265 CELLS/UL (ref 200–950)
MONOCYTES NFR BLD AUTO: 10.2 %
NEUTROPHILS # BLD AUTO: 1136 CELLS/UL (ref 1500–7800)
NEUTROPHILS NFR BLD AUTO: 43.7 %
NONHDLC SERPL-MCNC: 154 MG/DL (CALC)
PLATELET # BLD AUTO: 225 THOUSAND/UL (ref 140–400)
PMV BLD REES-ECKER: 8.8 FL (ref 7.5–12.5)
POTASSIUM SERPL-SCNC: 4.4 MMOL/L (ref 3.5–5.3)
PROT SERPL-MCNC: 7 G/DL (ref 6.1–8.1)
RBC # BLD AUTO: 4.38 MILLION/UL (ref 3.8–5.1)
SODIUM SERPL-SCNC: 139 MMOL/L (ref 135–146)
TRIGL SERPL-MCNC: 73 MG/DL
TSH SERPL-ACNC: 1.22 MIU/L (ref 0.4–4.5)
WBC # BLD AUTO: 2.6 THOUSAND/UL (ref 3.8–10.8)

## 2025-07-05 DIAGNOSIS — D72.819 LEUKOPENIA, UNSPECIFIED TYPE: Primary | ICD-10-CM

## 2025-07-28 ENCOUNTER — TELEPHONE (OUTPATIENT)
Dept: PRIMARY CARE | Facility: CLINIC | Age: 75
End: 2025-07-28
Payer: MEDICARE

## 2025-07-28 DIAGNOSIS — M19.91 PRIMARY OSTEOARTHRITIS, UNSPECIFIED SITE: Primary | ICD-10-CM

## 2025-08-03 DIAGNOSIS — M54.12 CERVICAL RADICULOPATHY: ICD-10-CM

## 2025-08-04 RX ORDER — GABAPENTIN 300 MG/1
300 CAPSULE ORAL
Qty: 90 CAPSULE | Refills: 1 | Status: SHIPPED | OUTPATIENT
Start: 2025-08-04

## 2025-09-04 ENCOUNTER — APPOINTMENT (OUTPATIENT)
Dept: RADIOLOGY | Facility: CLINIC | Age: 75
End: 2025-09-04
Payer: MEDICARE

## 2025-09-23 ENCOUNTER — APPOINTMENT (OUTPATIENT)
Dept: RHEUMATOLOGY | Facility: CLINIC | Age: 75
End: 2025-09-23
Payer: MEDICARE

## 2025-10-17 ENCOUNTER — APPOINTMENT (OUTPATIENT)
Dept: DERMATOLOGY | Facility: CLINIC | Age: 75
End: 2025-10-17
Payer: MEDICARE